# Patient Record
Sex: MALE | Race: WHITE | NOT HISPANIC OR LATINO | Employment: OTHER | URBAN - METROPOLITAN AREA
[De-identification: names, ages, dates, MRNs, and addresses within clinical notes are randomized per-mention and may not be internally consistent; named-entity substitution may affect disease eponyms.]

---

## 2024-07-22 ENCOUNTER — TELEPHONE (OUTPATIENT)
Dept: NEUROLOGY | Facility: CLINIC | Age: 59
End: 2024-07-22
Payer: COMMERCIAL

## 2024-07-22 NOTE — TELEPHONE ENCOUNTER
Dudley Webb MD Vo, Tommy  Caller: [phone] (1 week ago)  Nothing on chart  Any prior workup?          Previous Messages       ----- Message -----  From: Blayne Wan  Sent: 7/10/2024   3:49 PM CDT  To: Dudley Webb MD  Subject: RE: Pt Advice                                    Okay to see?  ----- Message -----  From: Zarina Trent  Sent: 7/10/2024   3:09 PM CDT  To: Tracey GUERRA Staff  Subject: Pt Advice                                        CONSULT/ADVISORY    Name of Caller:   AVNI JOYCELYN [[id number]]    Contact Preference:   [phone]    Nature of Call:  NP is requesting information stating he was recently diagnosed with early dementia due to resistant to Hypothyroid Syndrone.  Pt is requesting a call back to gather more information about this before scheduling.

## 2024-07-22 NOTE — TELEPHONE ENCOUNTER
Spoke with patient and asked if patient has access to any medical records to early dementia due hypothyroid. Patient confirmed that he has medical records and will get them sent over via fax.    Also asked patient if he has done any MRI/PET/LP/etc and confirmed that he has done MRI and PET. Results should be in medical records that will be faxed over.    However, patient will need to figure out how to send over images.    Pending on medical records faxes for now.

## 2024-11-01 ENCOUNTER — TELEPHONE (OUTPATIENT)
Dept: PRIMARY CARE CLINIC | Facility: CLINIC | Age: 59
End: 2024-11-01
Payer: COMMERCIAL

## 2024-11-05 ENCOUNTER — TELEPHONE (OUTPATIENT)
Dept: PRIMARY CARE CLINIC | Facility: CLINIC | Age: 59
End: 2024-11-05
Payer: MEDICARE

## 2024-11-06 ENCOUNTER — OFFICE VISIT (OUTPATIENT)
Dept: PRIMARY CARE CLINIC | Facility: CLINIC | Age: 59
End: 2024-11-06
Payer: MEDICARE

## 2024-11-06 VITALS
OXYGEN SATURATION: 95 % | DIASTOLIC BLOOD PRESSURE: 92 MMHG | HEART RATE: 81 BPM | BODY MASS INDEX: 22.16 KG/M2 | SYSTOLIC BLOOD PRESSURE: 130 MMHG | WEIGHT: 158.31 LBS | HEIGHT: 71 IN

## 2024-11-06 DIAGNOSIS — E89.0 POSTABLATIVE HYPOTHYROIDISM: ICD-10-CM

## 2024-11-06 DIAGNOSIS — Z00.00 PREVENTATIVE HEALTH CARE: ICD-10-CM

## 2024-11-06 DIAGNOSIS — R41.89 COGNITIVE DECLINE: Primary | ICD-10-CM

## 2024-11-06 DIAGNOSIS — M21.371 RIGHT FOOT DROP: ICD-10-CM

## 2024-11-06 DIAGNOSIS — E78.2 MIXED HYPERLIPIDEMIA: ICD-10-CM

## 2024-11-06 DIAGNOSIS — M85.89 OSTEOPENIA OF MULTIPLE SITES: ICD-10-CM

## 2024-11-06 DIAGNOSIS — Z12.11 SCREENING FOR COLON CANCER: ICD-10-CM

## 2024-11-06 DIAGNOSIS — K21.9 GASTROESOPHAGEAL REFLUX DISEASE WITHOUT ESOPHAGITIS: ICD-10-CM

## 2024-11-06 DIAGNOSIS — T07.XXXA MULTIPLE FRACTURES: ICD-10-CM

## 2024-11-06 DIAGNOSIS — R79.89 LOW TESTOSTERONE: ICD-10-CM

## 2024-11-06 DIAGNOSIS — E55.9 VITAMIN D DEFICIENCY: ICD-10-CM

## 2024-11-06 DIAGNOSIS — E53.8 B12 DEFICIENCY: ICD-10-CM

## 2024-11-06 PROBLEM — F03.90 DEMENTIA, UNSPECIFIED DEMENTIA SEVERITY, UNSPECIFIED DEMENTIA TYPE, UNSPECIFIED WHETHER BEHAVIORAL, PSYCHOTIC, OR MOOD DISTURBANCE OR ANXIETY: Status: ACTIVE | Noted: 2024-11-06

## 2024-11-06 PROCEDURE — 99215 OFFICE O/P EST HI 40 MIN: CPT | Mod: PBBFAC,PN | Performed by: INTERNAL MEDICINE

## 2024-11-06 PROCEDURE — 99999 PR PBB SHADOW E&M-EST. PATIENT-LVL V: CPT | Mod: PBBFAC,,, | Performed by: INTERNAL MEDICINE

## 2024-11-06 PROCEDURE — 99205 OFFICE O/P NEW HI 60 MIN: CPT | Mod: S$PBB,,, | Performed by: INTERNAL MEDICINE

## 2024-11-06 PROCEDURE — G2211 COMPLEX E/M VISIT ADD ON: HCPCS | Mod: S$PBB,,, | Performed by: INTERNAL MEDICINE

## 2024-11-06 RX ORDER — OMEPRAZOLE 40 MG/1
40 CAPSULE, DELAYED RELEASE ORAL DAILY
COMMUNITY
End: 2024-11-06 | Stop reason: SDUPTHER

## 2024-11-06 RX ORDER — LAMOTRIGINE 100 MG/1
100 TABLET ORAL DAILY
COMMUNITY
Start: 2024-09-11

## 2024-11-06 RX ORDER — TRAMADOL HYDROCHLORIDE 50 MG/1
50 TABLET ORAL EVERY 6 HOURS PRN
COMMUNITY

## 2024-11-06 RX ORDER — METHYLPHENIDATE HYDROCHLORIDE 20 MG/1
20 TABLET ORAL 2 TIMES DAILY
COMMUNITY

## 2024-11-06 RX ORDER — ESCITALOPRAM OXALATE 20 MG/1
20 TABLET ORAL DAILY
COMMUNITY

## 2024-11-06 RX ORDER — ALPRAZOLAM 1 MG/1
1 TABLET, EXTENDED RELEASE ORAL DAILY
COMMUNITY

## 2024-11-06 RX ORDER — MEMANTINE HYDROCHLORIDE 10 MG/1
10 TABLET ORAL 2 TIMES DAILY
COMMUNITY

## 2024-11-06 RX ORDER — LEVOTHYROXINE SODIUM 88 UG/1
88 TABLET ORAL
COMMUNITY

## 2024-11-06 RX ORDER — LEVOTHYROXINE SODIUM 100 UG/1
100 CAPSULE ORAL DAILY
COMMUNITY
End: 2024-11-06

## 2024-11-06 RX ORDER — OMEPRAZOLE 40 MG/1
40 CAPSULE, DELAYED RELEASE ORAL DAILY
Qty: 90 CAPSULE | Refills: 3 | Status: SHIPPED | OUTPATIENT
Start: 2024-11-06

## 2024-11-06 RX ORDER — TRAZODONE HYDROCHLORIDE 100 MG/1
100 TABLET ORAL NIGHTLY
COMMUNITY

## 2024-11-06 RX ORDER — BUPROPION HYDROCHLORIDE 300 MG/1
300 TABLET ORAL DAILY
COMMUNITY

## 2024-11-06 RX ORDER — METHOCARBAMOL 750 MG/1
750 TABLET, FILM COATED ORAL
COMMUNITY
End: 2024-11-06

## 2024-11-06 RX ORDER — LIOTHYRONINE SODIUM 5 UG/1
5 TABLET ORAL
COMMUNITY

## 2024-11-06 NOTE — PROGRESS NOTES
[facility] Progress Note      Chief Complaint     Establish Care and Annual Exam       Subjective:      History of Present Illness:  Lexa Abdullahi is a 58 y.o. male here to establish care - had labs drawn 10/14/2024  - now looking for a neuro-endocrinologist to help with his early cognitive decline/ dementia for which he is now on disability since 12/2023 from being a . He moved from [place] to [facility] in 2022 and his cognitive decline has improved or has at least stabilized since [place] was too stimulating.  His ex- wife lives in [place]. He has not worked since March 2021.    Early Cognitive Decline since 2022: dx'ed in 2022 with new right sided tremors; got lost going from [facility] to his home; didn't realize he had walked out of the courtroom  - had PET and Alzheimer's CSF biomarkers 9/2022 normal, EEG, MRI head 6/2022 WNL;   normal I-123 Eva scan 11/2023   -sleeps 18 hours per day not by choice but difficult trying to stay awake  -cognitive decline is stable on nameda 10mg and ritalin 20mg bid and does all ADLs without assistance  -s/p 3 concussions  - one as a child and 2 adult episodes of which one resulted in ICU stay with last in 2016   -saw Neurologist at [facility] in 5/2024 for 2nd opinion and he felt his cognitive decline is related to his AZUCENA/depression/OCD needing optimal tx and to ensure his hypothyroidism is well-controlled.  [facility] MMSE 29/30    Insomnia: takes Trazodone 50mg qhs prn (rarely)    DEXA 4/2021 - but had recent DEXA : osteopenia LS /Left femur -2.0; has broken approx 13 bones in past 10 years with hips, pelvis, hand, foot, fingers  -was getting prolia which was recently stopped since his repeat DEXA indicated osteopenia. He says he had a recent DEXA      Post-Ablative Hypothyroidism in 2021: now on cytomel 5mcg and levo 88mcg daily    5/2023     Pseudohyperparathyroidism: needs evaluation amb     Right foot drop:  to be evaluated with EMG/NCS tomorrow. As per ortho     Low testosterone 254 7/2023,  218 8/2022 but up to 400s in 10/2024 with hx osteoporosis    PSA 2.4 7/2023     Vit D 10/2024  37.6 on Vit D on MVI and Vit D 2000 IU daily     Vit B12  10/2024: 1492 on MVI and B12 1mg daily so pt stopped it      Hx osteoporosis formerly on Prolia: now on     Depression/Anxiety/OCD: : on lexapro/wellbutrin XL 300mg daily, lamictal 100mg daily  -takes Xanax XR 1mg daily prescribed by psychiatrist    SH: no alcohol, drugs, tobacco;  no longer biking as much as he had in the past;   FH: parents healthy; brother 55 y/o no issues except partial thyroidectomy      GERD: takes omeprazole 40mg daily     SH: moved from [place] in 2022; lives alone     Past Medical History:  No past medical history on file.    Past Surgical History:  No past surgical history on file.    Allergies:  Review of patient's allergies indicates:   Allergen Reactions    Clarithromycin Other (See Comments)       Home Medications:  Current Outpatient Medications   Medication Sig Dispense Refill    ALPRAZolam (XANAX XR) 1 MG Tb24 Take 1 mg by mouth once daily.      buPROPion (WELLBUTRIN XL) 300 MG 24 hr tablet Take 300 mg by mouth once daily.      EScitalopram oxalate (LEXAPRO) 20 MG tablet Take 20 mg by mouth once daily.      lamoTRIgine (LAMICTAL) 100 MG tablet Take 100 mg by mouth once daily.      liothyronine (CYTOMEL) 5 MCG Tab Take 5 mcg by mouth.      memantine (NAMENDA) 10 MG Tab Take 10 mg by mouth 2 (two) times daily.      SYNTHROID 88 mcg tablet Take 88 mcg by mouth.      traMADoL (ULTRAM) 50 mg tablet Take 50 mg by mouth every 6 (six) hours as needed.      traZODone (DESYREL) 100 MG tablet Take 100 mg by mouth every evening.      methylphenidate HCl (RITALIN) 20 MG tablet Take 20 mg by mouth 2 (two) times daily.      omeprazole (PRILOSEC) 40 MG capsule Take 1 capsule (40 mg total) by mouth once daily. 90 capsule 3     No current facility-administered  "medications for this visit.        Family History:  No family history on file.    Social History:  Social History     Tobacco Use    Smoking status: Never    Smokeless tobacco: Never       Review of Systems:  Pertinent positives and negatives listed in HPI. All other systems are reviewed and are negative.    Health Maintaince :   Health Maintenance Topics with due status: Not Due       Topic Last Completion Date    RSV Vaccine (Age 60+ and Pregnant patients) Not Due           Eye: UTD  Dental: UTD  Flu and COVID UTD    Immunizations:   Cancer Screening:  Colonoscopy: needs  DEXA:  12/2023:   left hip -1.9, left FN -2.2, left forearm -2.22; LS -1.0 DIS in [place]       The ASCVD Risk score (Too CONN, et al., 2019) failed to calculate for the following reasons:    Cannot find a previous HDL lab    Cannot find a previous total cholesterol lab      Objective:   BP (!) 130/92 (BP Location: Left arm, Patient Position: Sitting)   Pulse 81   Ht 5' 11" (1.803 m)   Wt 71.8 kg (158 lb 4.6 oz)   SpO2 95%   BMI 22.08 kg/m²      Body mass index is 22.08 kg/m².       Physical Examination:  General: Alert and awake in no apparent distress  HEENT: Normocephalic and atraumatic; Tms WNL  Eyes:  PERRL; EOMi with anicteric sclera and clear conjunctivae  Mouth:  Oropharynx clear and without exudate; moist mucous membranes  Neck:   Cervical nodes not enlarged (No submental, submandibular, preauricular, posterior auricular or occipital adenopathy); supple; no bruits  Cardio:  Regular rate and rhythm with normal S1 and S2; no murmurs or rubs  Resp:  CTAB; respirations unlabored; no wheezes, crackles or rhonchi  Abdom: Soft, NTND with normoactive bowel sounds; negative HSM  Extrem: Warm and well-perfused with no clubbing, cyanosis or edema  Skin:  No rashes, lesions, or color changes  Pulses:  2+ and symmetric distally  Neuro:  AAOx3; cooperative and pleasant with no focal deficits  Folstein:          Orientation to time, place, " "situation, person; spells world forward and backwards; 3/3 words immediate; 1/3 at 5 minutes (string, purple, table); draws clock with hands at 2:30    Laboratory:      Most Recent Data:  Lab Results   Component Value Date    HGBA1C 5.4 08/18/2022              CBC: No results found for: "WBC", "HGB", "HCT", "PLT", "MCV", "RDW"  BMP: No results found for: "NA", "K", "CL", "CO2", "BUN", "CREATININE", "GLU", "CALCIUM", "MG", "PHOS"  LFTs: No results found for: "PROT", "ALBUMIN", "BILITOT", "AST", "ALKPHOS", "ALT", "GGT"  Coags: No results found for: "INR", "PROTIME", "PTT"  FLP:    No results found for: "CHOL"  No results found for: "HDL"  No results found for: "LDLCALC"  No results found for: "TRIG"  No results found for: "CHOLHDL"   DM:      Hemoglobin A1C   Date Value Ref Range Status   08/18/2022 5.4 4.8 - 5.6 % Final     Comment:              Prediabetes: 5.7 - 6.4           Diabetes: >6.4           Glycemic control for adults with diabetes: <7.0   04/29/2021 5.4 4.8 - 5.6 % Final     Comment:              Prediabetes: 5.7 - 6.4           Diabetes: >6.4           Glycemic control for adults with diabetes: <7.0     Thyroid: No results found for: "TSH", "FREET4", "Y0JQCCW", "F0MZFHE", "THYROIDAB"  Anemia: No results found for: "IRON", "TIBC", "FERRITIN", "[id number]", "FOLATE"  Cardiac: No results found for: "TROPONINI", "CKTOTAL", "CKMB", "BNP"  Urinalysis: No results found for: "LABURIN", "COLORU", "PHUA", "CLARITYU", "SPECGRAV", "LABSPEC", "NITRITE", "PROTEINUR", "GLUCOSEU", "KETONESU", "UROBILINOGEN", "BILIRUBINUR", "BLOODU", "RBCU", "WBCUA"    Other Results:  EKG (my interpretation):     Radiology:  No image results found.          Assessment/Plan     Lexa Abdullahi is a 58 y.o. male with:  1. Cognitive decline  Assessment & Plan:  Cont nameda 10mg and ritalin 20mg bid   Cont Xanax prn as per psychiatrist in [place]   -hope to be able to get a neuro-endocrinologist   Possibly exacerbated by multiple " concussions, stress, psychiatric issues since improved when pt moved to [place] from [place]      2. Screening for colon cancer  Assessment & Plan:  Refer to endocrine, colonoscopy   Labs Friday 8am here   Provide MRI head, DEXA, EEG results     Orders:  -     Ambulatory referral/consult to Endo Procedure ; Future; Expected date: 11/07/2024    3. Preventative health care  Assessment & Plan:  Refer to endocrine, colonoscopy   Labs Friday 8am here   Provide MRI head, DEXA, EEG results     Orders:  -     Urinalysis; Future; Expected date: 11/06/2024  -     Treponema Pallidium Antibodies IgG, IgM; Future; Expected date: 11/06/2024  -     PROSTATE SPECIFIC ANTIGEN, DIAGNOSTIC; Future; Expected date: 11/06/2024  -     HIV 1/2 Ag/Ab (4th Gen); Future; Expected date: 11/06/2024  -     COMPREHENSIVE METABOLIC PANEL; Future; Expected date: 11/06/2024    4. Low testosterone  Overview:  Testosterone level 254 7/2023, 218 8/2022 but up to 400s in 10/2024      Assessment & Plan:  Check LH/FSH/testosterone level in am - if low, may benefit from testosterone   Also with hx osteoporosis s/p Prolia     Orders:  -     Ambulatory referral/consult to Endocrinology; Future; Expected date: 11/06/2024  -     Testosterone, free; Future; Expected date: 11/06/2024  -     LUTEINIZING HORMONE; Future; Expected date: 11/06/2024  -     FOLLICLE STIMULATING HORMONE; Future; Expected date: 11/06/2024    5. Postablative hypothyroidism  Assessment & Plan:  Check thyroid labs on Cytomel 5 mcg daily and Levo  88mcg daily     Orders:  -     Ambulatory referral/consult to Endocrinology; Future; Expected date: 11/06/2024  -     TSH; Future; Expected date: 11/06/2024  -     T4, FREE; Future; Expected date: 11/06/2024  -     T3; Future; Expected date: 11/06/2024    6. Vitamin D deficiency  Assessment & Plan:  Check Vit D  Restart Vit D 2000IU daily and Citracal Max Plus     Orders:  -     Vitamin D; Future; Expected date: 11/06/2024    7. B12 deficiency  -      Vitamin B12; Future; Expected date: 11/06/2024    8. Mixed hyperlipidemia  -     Lipid Panel; Future; Expected date: 11/06/2024  -     CBC W/ AUTO DIFFERENTIAL; Future; Expected date: 11/06/2024    9. Gastroesophageal reflux disease without esophagitis  Assessment & Plan:  Refill Prilosec 40mg daily   Initiate GERD precautions ie lose weight, avoid eating 3 hours prior to bed, minimize caffeine, alcohol, tobacco, spicy, greasy, fatty foods; avoid peppermint chocolates, citrus and other acidic foods. Increase exercise to help with digestion and avoid lying down immediately after eating.  Elevate head of bed if GERD awakens pt from sleep.  Eat 6 small snacks rather than 3 large meals.      Orders:  -     omeprazole (PRILOSEC) 40 MG capsule; Take 1 capsule (40 mg total) by mouth once daily.  Dispense: 90 capsule; Refill: 3    10. Right foot drop  Assessment & Plan:  Get EMG/NCS as per ortho      11. 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 Osteopenia of multiple sites  Overview:  DEXA:  12/2023:   left hip -1.9, left FN -2.2, left forearm -2.22; LS -1.0 DIS in [place]       Assessment & Plan:  Formerly on prolia but stopped due to improvement from osteoporosis   Check Vit d level   Needs to be taking Citracal max plus  Weight-bearing exercise 30 min 5 times per week                I spent a total of 85 minutes on the day of the visit.This includes face to face time and non-face to face time preparing to see the patient (eg, review of tests), obtaining and/or reviewing separately obtained history, documenting clinical information in the electronic or other health record, independently interpreting results and communicating results to the patient/family/caregiver, or care coordinator.   Code Status:     Susannah Anderson MD

## 2024-11-06 NOTE — ASSESSMENT & PLAN NOTE
Refill Prilosec 40mg daily   Initiate GERD precautions ie lose weight, avoid eating 3 hours prior to bed, minimize caffeine, alcohol, tobacco, spicy, greasy, fatty foods; avoid peppermint chocolates, citrus and other acidic foods. Increase exercise to help with digestion and avoid lying down immediately after eating.  Elevate head of bed if GERD awakens pt from sleep.  Eat 6 small snacks rather than 3 large meals.

## 2024-11-06 NOTE — ASSESSMENT & PLAN NOTE
Check LH/FSH/testosterone level in am - if low, may benefit from testosterone   Also with hx osteoporosis s/p Prolia

## 2024-11-06 NOTE — PATIENT INSTRUCTIONS
Screening for colon cancer  Assessment & Plan:  Refer to endocrine, colonoscopy   Labs Friday 8am here   Provide MRI head results     Orders:  -     Ambulatory referral/consult to Endo Procedure ; Future; Expected date: 11/07/2024    Preventative health care  Assessment & Plan:  Refer to endocrine, colonoscopy   Labs Friday 8am here   Provide MRI head results     Orders:  -     Urinalysis; Future; Expected date: 11/06/2024  -     Treponema Pallidium Antibodies IgG, IgM; Future; Expected date: 11/06/2024  -     PROSTATE SPECIFIC ANTIGEN, DIAGNOSTIC; Future; Expected date: 11/06/2024  -     HIV 1/2 Ag/Ab (4th Gen); Future; Expected date: 11/06/2024  -     COMPREHENSIVE METABOLIC PANEL; Future; Expected date: 11/06/2024    Low testosterone  Assessment & Plan:  Check level in am     Orders:  -     Ambulatory referral/consult to Endocrinology; Future; Expected date: 11/06/2024  -     Testosterone, free; Future; Expected date: 11/06/2024    Postablative hypothyroidism  Assessment & Plan:  Check thyroid labs on Cytomel 5 mcg daily and synthroid 88mcg daily     Orders:  -     Ambulatory referral/consult to Endocrinology; Future; Expected date: 11/06/2024  -     TSH; Future; Expected date: 11/06/2024  -     T4, FREE; Future; Expected date: 11/06/2024  -     T3; Future; Expected date: 11/06/2024    Vitamin D deficiency  Assessment & Plan:  Check Vit D 2000IU daily and Citracal Max Plus     Orders:  -     Vitamin D; Future; Expected date: 11/06/2024    B12 deficiency  -     Vitamin B12; Future; Expected date: 11/06/2024    Mixed hyperlipidemia  -     Lipid Panel; Future; Expected date: 11/06/2024  -     CBC W/ AUTO DIFFERENTIAL; Future; Expected date: 11/06/2024    Gastroesophageal reflux disease without esophagitis  Assessment & Plan:  Refill Prilosec 40mg daily   Initiate GERD precautions ie lose weight, avoid eating 3 hours prior to bed, minimize caffeine, alcohol, tobacco, spicy, greasy, fatty foods; avoid  peppermint chocolates, citrus and other acidic foods. Increase exercise to help with digestion and avoid lying down immediately after eating.  Elevate head of bed if GERD awakens pt from sleep.  Eat 6 small snacks rather than 3 large meals.      Orders:  -     omeprazole (PRILOSEC) 40 MG capsule; Take 1 capsule (40 mg total) by mouth once daily.  Dispense: 90 capsule; Refill: 3

## 2024-11-07 NOTE — ASSESSMENT & PLAN NOTE
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

## 2024-11-07 NOTE — ASSESSMENT & PLAN NOTE
Cont nameda 10mg and ritalin 20mg bid   Cont Xanax prn as per psychiatrist in [place]   -hope to be able to get a neuro-endocrinologist   Possibly exacerbated by multiple concussions, stress, psychiatric issues since improved when pt moved to [place] from [place]

## 2024-11-08 ENCOUNTER — LAB VISIT (OUTPATIENT)
Dept: LAB | Facility: HOSPITAL | Age: 59
End: 2024-11-08
Attending: INTERNAL MEDICINE
Payer: COMMERCIAL

## 2024-11-08 ENCOUNTER — TELEPHONE (OUTPATIENT)
Dept: PRIMARY CARE CLINIC | Facility: CLINIC | Age: 59
End: 2024-11-08
Payer: MEDICARE

## 2024-11-08 DIAGNOSIS — E89.0 POSTABLATIVE HYPOTHYROIDISM: ICD-10-CM

## 2024-11-08 DIAGNOSIS — E53.8 B12 DEFICIENCY: ICD-10-CM

## 2024-11-08 DIAGNOSIS — E78.2 MIXED HYPERLIPIDEMIA: ICD-10-CM

## 2024-11-08 DIAGNOSIS — E55.9 VITAMIN D DEFICIENCY: ICD-10-CM

## 2024-11-08 DIAGNOSIS — R79.89 LOW TESTOSTERONE: ICD-10-CM

## 2024-11-08 DIAGNOSIS — Z00.00 PREVENTATIVE HEALTH CARE: ICD-10-CM

## 2024-11-08 LAB
25(OH)D3+25(OH)D2 SERPL-MCNC: 34 NG/ML (ref 30–96)
ALBUMIN SERPL BCP-MCNC: 4 G/DL (ref 3.5–5.2)
ALP SERPL-CCNC: 74 U/L (ref 40–150)
ALT SERPL W/O P-5'-P-CCNC: 23 U/L (ref 10–44)
ANION GAP SERPL CALC-SCNC: 11 MMOL/L (ref 8–16)
AST SERPL-CCNC: 32 U/L (ref 10–40)
BASOPHILS # BLD AUTO: 0.03 K/UL (ref 0–0.2)
BASOPHILS NFR BLD: 0.7 % (ref 0–1.9)
BILIRUB SERPL-MCNC: 0.6 MG/DL (ref 0.1–1)
BUN SERPL-MCNC: 18 MG/DL (ref 6–20)
CALCIUM SERPL-MCNC: 9.6 MG/DL (ref 8.7–10.5)
CHLORIDE SERPL-SCNC: 103 MMOL/L (ref 95–110)
CHOLEST SERPL-MCNC: 232 MG/DL (ref 120–199)
CHOLEST/HDLC SERPL: 3.1 {RATIO} (ref 2–5)
CO2 SERPL-SCNC: 27 MMOL/L (ref 23–29)
COMPLEXED PSA SERPL-MCNC: 4.8 NG/ML (ref 0–4)
CREAT SERPL-MCNC: 0.9 MG/DL (ref 0.5–1.4)
DIFFERENTIAL METHOD BLD: ABNORMAL
EOSINOPHIL # BLD AUTO: 0.1 K/UL (ref 0–0.5)
EOSINOPHIL NFR BLD: 2.9 % (ref 0–8)
ERYTHROCYTE [DISTWIDTH] IN BLOOD BY AUTOMATED COUNT: 15 % (ref 11.5–14.5)
EST. GFR  (NO RACE VARIABLE): >60 ML/MIN/1.73 M^2
GLUCOSE SERPL-MCNC: 87 MG/DL (ref 70–110)
HCT VFR BLD AUTO: 37.6 % (ref 40–54)
HDLC SERPL-MCNC: 75 MG/DL (ref 40–75)
HDLC SERPL: 32.3 % (ref 20–50)
HGB BLD-MCNC: 12.4 G/DL (ref 14–18)
HIV 1+2 AB+HIV1 P24 AG SERPL QL IA: NORMAL
IMM GRANULOCYTES # BLD AUTO: 0.02 K/UL (ref 0–0.04)
IMM GRANULOCYTES NFR BLD AUTO: 0.5 % (ref 0–0.5)
LDLC SERPL CALC-MCNC: 139.8 MG/DL (ref 63–159)
LYMPHOCYTES # BLD AUTO: 1.6 K/UL (ref 1–4.8)
LYMPHOCYTES NFR BLD: 39.3 % (ref 18–48)
MCH RBC QN AUTO: 31.3 PG (ref 27–31)
MCHC RBC AUTO-ENTMCNC: 33 G/DL (ref 32–36)
MCV RBC AUTO: 95 FL (ref 82–98)
MONOCYTES # BLD AUTO: 0.3 K/UL (ref 0.3–1)
MONOCYTES NFR BLD: 7.2 % (ref 4–15)
NEUTROPHILS # BLD AUTO: 2.1 K/UL (ref 1.8–7.7)
NEUTROPHILS NFR BLD: 49.4 % (ref 38–73)
NONHDLC SERPL-MCNC: 157 MG/DL
NRBC BLD-RTO: 0 /100 WBC
PLATELET # BLD AUTO: 238 K/UL (ref 150–450)
PMV BLD AUTO: 10.1 FL (ref 9.2–12.9)
POTASSIUM SERPL-SCNC: 4 MMOL/L (ref 3.5–5.1)
PROT SERPL-MCNC: 6.6 G/DL (ref 6–8.4)
RBC # BLD AUTO: 3.96 M/UL (ref 4.6–6.2)
SODIUM SERPL-SCNC: 141 MMOL/L (ref 136–145)
T3 SERPL-MCNC: 94 NG/DL (ref 60–180)
T4 FREE SERPL-MCNC: 0.83 NG/DL (ref 0.71–1.51)
TREPONEMA PALLIDUM IGG+IGM AB [PRESENCE] IN SERUM OR PLASMA BY IMMUNOASSAY: NONREACTIVE
TRIGL SERPL-MCNC: 86 MG/DL (ref 30–150)
TSH SERPL DL<=0.005 MIU/L-ACNC: 132.44 UIU/ML (ref 0.4–4)
VIT B12 SERPL-MCNC: 659 PG/ML (ref 210–950)
WBC # BLD AUTO: 4.15 K/UL (ref 3.9–12.7)

## 2024-11-08 PROCEDURE — 80053 COMPREHEN METABOLIC PANEL: CPT | Performed by: INTERNAL MEDICINE

## 2024-11-08 PROCEDURE — 84480 ASSAY TRIIODOTHYRONINE (T3): CPT | Performed by: INTERNAL MEDICINE

## 2024-11-08 PROCEDURE — 84402 ASSAY OF FREE TESTOSTERONE: CPT | Performed by: INTERNAL MEDICINE

## 2024-11-08 PROCEDURE — 84443 ASSAY THYROID STIM HORMONE: CPT | Performed by: INTERNAL MEDICINE

## 2024-11-08 PROCEDURE — 82306 VITAMIN D 25 HYDROXY: CPT | Performed by: INTERNAL MEDICINE

## 2024-11-08 PROCEDURE — 84153 ASSAY OF PSA TOTAL: CPT | Performed by: INTERNAL MEDICINE

## 2024-11-08 PROCEDURE — 80061 LIPID PANEL: CPT | Performed by: INTERNAL MEDICINE

## 2024-11-08 PROCEDURE — 36415 COLL VENOUS BLD VENIPUNCTURE: CPT | Mod: PN | Performed by: INTERNAL MEDICINE

## 2024-11-08 PROCEDURE — 86593 SYPHILIS TEST NON-TREP QUANT: CPT | Performed by: INTERNAL MEDICINE

## 2024-11-08 PROCEDURE — 85025 COMPLETE CBC W/AUTO DIFF WBC: CPT | Performed by: INTERNAL MEDICINE

## 2024-11-08 PROCEDURE — 87389 HIV-1 AG W/HIV-1&-2 AB AG IA: CPT | Performed by: INTERNAL MEDICINE

## 2024-11-08 PROCEDURE — 82607 VITAMIN B-12: CPT | Performed by: INTERNAL MEDICINE

## 2024-11-08 PROCEDURE — 84439 ASSAY OF FREE THYROXINE: CPT | Performed by: INTERNAL MEDICINE

## 2024-11-08 NOTE — ASSESSMENT & PLAN NOTE
Formerly on prolia but stopped due to improvement from osteoporosis   Check Vit d level   Needs to be taking Citracal max plus  Weight-bearing exercise 30 min 5 times per week

## 2024-11-09 DIAGNOSIS — R97.20 ELEVATED PSA: Primary | ICD-10-CM

## 2024-11-09 DIAGNOSIS — D64.9 ANEMIA, UNSPECIFIED TYPE: Primary | ICD-10-CM

## 2024-11-11 LAB — TESTOST FREE SERPL-MCNC: 7.7 PG/ML

## 2024-11-13 NOTE — PROGRESS NOTES
"Clinic Note  11/13/2024      Subjective:         Chief Complaint:   HPI  Lexa Abdullahi is a 58 y.o. male with an elevated PSA (4.8). Consult from Dr. Anderson.   out on disability.  FH-negative  PSA 2.4 11/30/2022     PCPT-      Lab Results   Component Value Date    PSADIAG 4.8 (H) 11/08/2024      No past medical history on file.  No family history on file.  Social History     Socioeconomic History    Marital status:    Tobacco Use    Smoking status: Never    Smokeless tobacco: Never     No past surgical history on file.  Patient Active Problem List   Diagnosis    Preventative health care    Low testosterone    Postablative hypothyroidism    Vitamin D deficiency    GERD (gastroesophageal reflux disease)    Cognitive decline    Right foot drop    B12 deficiency    Multiple fractures    Osteopenia of multiple sites    Elevated PSA     Review of Systems   Constitutional:  Negative for appetite change, chills, fatigue, fever and unexpected weight change.   HENT:  Negative for nosebleeds.    Respiratory:  Negative for shortness of breath and wheezing.    Cardiovascular:  Negative for chest pain, palpitations and leg swelling.   Gastrointestinal:  Negative for abdominal distention, abdominal pain, constipation, diarrhea, nausea and vomiting.   Genitourinary:  Negative for dysuria and hematuria.   Musculoskeletal:  Negative for arthralgias and back pain.   Skin:  Negative for pallor.   Neurological:  Negative for dizziness, seizures and syncope.   Hematological:  Negative for adenopathy.   Psychiatric/Behavioral:  Negative for dysphoric mood.          Objective:      There were no vitals taken for this visit.  Estimated body mass index is 22.08 kg/m² as calculated from the following:    Height as of 11/6/24: 5' 11" (1.803 m).    Weight as of 11/6/24: 71.8 kg (158 lb 4.6 oz).  Physical Exam      Assessment and Plan:     Discussed elevated PSA.      Problem List Items Addressed This Visit       " Elevated PSA - Primary       Follow up:   MRI and biopsy after.    Luis Chase

## 2024-11-14 ENCOUNTER — OFFICE VISIT (OUTPATIENT)
Dept: UROLOGY | Facility: CLINIC | Age: 59
End: 2024-11-14
Payer: COMMERCIAL

## 2024-11-14 VITALS
DIASTOLIC BLOOD PRESSURE: 92 MMHG | HEART RATE: 88 BPM | SYSTOLIC BLOOD PRESSURE: 144 MMHG | HEIGHT: 71 IN | WEIGHT: 157.88 LBS | BODY MASS INDEX: 22.1 KG/M2

## 2024-11-14 DIAGNOSIS — R97.20 ELEVATED PSA: Primary | ICD-10-CM

## 2024-11-14 PROCEDURE — 99204 OFFICE O/P NEW MOD 45 MIN: CPT | Mod: S$GLB,,, | Performed by: UROLOGY

## 2024-11-14 PROCEDURE — 99999 PR PBB SHADOW E&M-EST. PATIENT-LVL IV: CPT | Mod: PBBFAC,,, | Performed by: UROLOGY

## 2024-11-14 RX ORDER — CIPROFLOXACIN 500 MG/1
500 TABLET ORAL ONCE
Qty: 1 TABLET | Refills: 0 | Status: SHIPPED | OUTPATIENT
Start: 2024-11-14 | End: 2024-11-14

## 2024-11-14 RX ORDER — CEFTRIAXONE 1 G/1
1 INJECTION, POWDER, FOR SOLUTION INTRAMUSCULAR; INTRAVENOUS
Status: SHIPPED | OUTPATIENT
Start: 2024-11-14 | End: 2024-11-14